# Patient Record
Sex: MALE | Race: WHITE | Employment: OTHER | URBAN - METROPOLITAN AREA
[De-identification: names, ages, dates, MRNs, and addresses within clinical notes are randomized per-mention and may not be internally consistent; named-entity substitution may affect disease eponyms.]

---

## 2019-08-01 ENCOUNTER — HOSPITAL ENCOUNTER (INPATIENT)
Age: 79
LOS: 3 days | Discharge: HOME | DRG: 871 | End: 2019-08-04
Attending: EMERGENCY MEDICINE | Admitting: INTERNAL MEDICINE
Payer: MEDICARE

## 2019-08-01 ENCOUNTER — APPOINTMENT (OUTPATIENT)
Dept: CT IMAGING | Age: 79
DRG: 871 | End: 2019-08-01
Payer: MEDICARE

## 2019-08-01 ENCOUNTER — APPOINTMENT (OUTPATIENT)
Dept: GENERAL RADIOLOGY | Age: 79
DRG: 871 | End: 2019-08-01
Payer: MEDICARE

## 2019-08-01 DIAGNOSIS — J96.01 ACUTE RESPIRATORY FAILURE WITH HYPOXIA (HCC): Primary | ICD-10-CM

## 2019-08-01 DIAGNOSIS — J18.9 MULTIFOCAL PNEUMONIA: ICD-10-CM

## 2019-08-01 PROBLEM — I10 HYPERTENSION: Status: ACTIVE | Noted: 2019-08-01

## 2019-08-01 PROBLEM — E78.5 HYPERLIPIDEMIA: Status: ACTIVE | Noted: 2019-08-01

## 2019-08-01 PROBLEM — I25.10 CAD (CORONARY ARTERY DISEASE): Status: ACTIVE | Noted: 2019-08-01

## 2019-08-01 PROBLEM — J44.9 COPD (CHRONIC OBSTRUCTIVE PULMONARY DISEASE) (HCC): Status: ACTIVE | Noted: 2019-08-01

## 2019-08-01 PROBLEM — A41.9 SEPSIS (HCC): Status: ACTIVE | Noted: 2019-08-01

## 2019-08-01 LAB
A/G RATIO: 1 (ref 1.1–2.2)
ALBUMIN SERPL-MCNC: 3.9 G/DL (ref 3.4–5)
ALP BLD-CCNC: 89 U/L (ref 40–129)
ALT SERPL-CCNC: 16 U/L (ref 10–40)
AMORPHOUS: ABNORMAL /HPF
ANION GAP SERPL CALCULATED.3IONS-SCNC: 13 MMOL/L (ref 3–16)
AST SERPL-CCNC: 26 U/L (ref 15–37)
BASOPHILS ABSOLUTE: 0 K/UL (ref 0–0.2)
BASOPHILS RELATIVE PERCENT: 0.1 %
BILIRUB SERPL-MCNC: 1.4 MG/DL (ref 0–1)
BILIRUBIN URINE: NEGATIVE
BLOOD, URINE: NEGATIVE
BUN BLDV-MCNC: 19 MG/DL (ref 7–20)
CALCIUM SERPL-MCNC: 9.8 MG/DL (ref 8.3–10.6)
CHLORIDE BLD-SCNC: 96 MMOL/L (ref 99–110)
CLARITY: CLEAR
CO2: 25 MMOL/L (ref 21–32)
COLOR: YELLOW
CREAT SERPL-MCNC: 0.7 MG/DL (ref 0.8–1.3)
EOSINOPHILS ABSOLUTE: 0 K/UL (ref 0–0.6)
EOSINOPHILS RELATIVE PERCENT: 0 %
GFR AFRICAN AMERICAN: >60
GFR NON-AFRICAN AMERICAN: >60
GLOBULIN: 4 G/DL
GLUCOSE BLD-MCNC: 134 MG/DL (ref 70–99)
GLUCOSE URINE: 100 MG/DL
HCT VFR BLD CALC: 44.6 % (ref 40.5–52.5)
HEMOGLOBIN: 15.4 G/DL (ref 13.5–17.5)
KETONES, URINE: ABNORMAL MG/DL
LACTIC ACID, SEPSIS: 4.7 MMOL/L (ref 0.4–1.9)
LACTIC ACID, SEPSIS: 4.7 MMOL/L (ref 0.4–1.9)
LACTIC ACID: 2 MMOL/L (ref 0.4–2)
LEUKOCYTE ESTERASE, URINE: NEGATIVE
LYMPHOCYTES ABSOLUTE: 0.8 K/UL (ref 1–5.1)
LYMPHOCYTES RELATIVE PERCENT: 5 %
MCH RBC QN AUTO: 30.9 PG (ref 26–34)
MCHC RBC AUTO-ENTMCNC: 34.6 G/DL (ref 31–36)
MCV RBC AUTO: 89.4 FL (ref 80–100)
MICROSCOPIC EXAMINATION: YES
MONOCYTES ABSOLUTE: 0.3 K/UL (ref 0–1.3)
MONOCYTES RELATIVE PERCENT: 1.9 %
NEUTROPHILS ABSOLUTE: 15.7 K/UL (ref 1.7–7.7)
NEUTROPHILS RELATIVE PERCENT: 93 %
NITRITE, URINE: NEGATIVE
PDW BLD-RTO: 15 % (ref 12.4–15.4)
PH UA: 6 (ref 5–8)
PLATELET # BLD: 273 K/UL (ref 135–450)
PMV BLD AUTO: 6.8 FL (ref 5–10.5)
POTASSIUM SERPL-SCNC: 4.4 MMOL/L (ref 3.5–5.1)
PRO-BNP: 743 PG/ML (ref 0–449)
PROTEIN UA: ABNORMAL MG/DL
RBC # BLD: 4.98 M/UL (ref 4.2–5.9)
RBC UA: ABNORMAL /HPF (ref 0–2)
SODIUM BLD-SCNC: 134 MMOL/L (ref 136–145)
SPECIFIC GRAVITY UA: 1.01 (ref 1–1.03)
TOTAL PROTEIN: 7.9 G/DL (ref 6.4–8.2)
TROPONIN: <0.01 NG/ML
URINE TYPE: ABNORMAL
UROBILINOGEN, URINE: 1 E.U./DL
WBC # BLD: 16.9 K/UL (ref 4–11)
WBC UA: ABNORMAL /HPF (ref 0–5)

## 2019-08-01 PROCEDURE — 71045 X-RAY EXAM CHEST 1 VIEW: CPT

## 2019-08-01 PROCEDURE — 96375 TX/PRO/DX INJ NEW DRUG ADDON: CPT

## 2019-08-01 PROCEDURE — 85025 COMPLETE CBC W/AUTO DIFF WBC: CPT

## 2019-08-01 PROCEDURE — 99285 EMERGENCY DEPT VISIT HI MDM: CPT

## 2019-08-01 PROCEDURE — 87040 BLOOD CULTURE FOR BACTERIA: CPT

## 2019-08-01 PROCEDURE — 2580000003 HC RX 258: Performed by: NURSE PRACTITIONER

## 2019-08-01 PROCEDURE — 94640 AIRWAY INHALATION TREATMENT: CPT

## 2019-08-01 PROCEDURE — 96367 TX/PROPH/DG ADDL SEQ IV INF: CPT

## 2019-08-01 PROCEDURE — 6370000000 HC RX 637 (ALT 250 FOR IP): Performed by: NURSE PRACTITIONER

## 2019-08-01 PROCEDURE — 36415 COLL VENOUS BLD VENIPUNCTURE: CPT

## 2019-08-01 PROCEDURE — 83880 ASSAY OF NATRIURETIC PEPTIDE: CPT

## 2019-08-01 PROCEDURE — 83605 ASSAY OF LACTIC ACID: CPT

## 2019-08-01 PROCEDURE — 2700000000 HC OXYGEN THERAPY PER DAY

## 2019-08-01 PROCEDURE — 80053 COMPREHEN METABOLIC PANEL: CPT

## 2019-08-01 PROCEDURE — 1200000000 HC SEMI PRIVATE

## 2019-08-01 PROCEDURE — 6360000002 HC RX W HCPCS: Performed by: EMERGENCY MEDICINE

## 2019-08-01 PROCEDURE — 71250 CT THORAX DX C-: CPT

## 2019-08-01 PROCEDURE — 96365 THER/PROPH/DIAG IV INF INIT: CPT

## 2019-08-01 PROCEDURE — 94761 N-INVAS EAR/PLS OXIMETRY MLT: CPT

## 2019-08-01 PROCEDURE — 2580000003 HC RX 258: Performed by: EMERGENCY MEDICINE

## 2019-08-01 PROCEDURE — 6370000000 HC RX 637 (ALT 250 FOR IP): Performed by: EMERGENCY MEDICINE

## 2019-08-01 PROCEDURE — 81001 URINALYSIS AUTO W/SCOPE: CPT

## 2019-08-01 PROCEDURE — 84484 ASSAY OF TROPONIN QUANT: CPT

## 2019-08-01 RX ORDER — LOSARTAN POTASSIUM 25 MG/1
50 TABLET ORAL DAILY
Status: DISCONTINUED | OUTPATIENT
Start: 2019-08-02 | End: 2019-08-01

## 2019-08-01 RX ORDER — 0.9 % SODIUM CHLORIDE 0.9 %
30 INTRAVENOUS SOLUTION INTRAVENOUS ONCE
Status: COMPLETED | OUTPATIENT
Start: 2019-08-01 | End: 2019-08-02

## 2019-08-01 RX ORDER — SODIUM CHLORIDE, SODIUM LACTATE, POTASSIUM CHLORIDE, AND CALCIUM CHLORIDE .6; .31; .03; .02 G/100ML; G/100ML; G/100ML; G/100ML
1000 INJECTION, SOLUTION INTRAVENOUS ONCE
Status: COMPLETED | OUTPATIENT
Start: 2019-08-01 | End: 2019-08-01

## 2019-08-01 RX ORDER — ASPIRIN 81 MG/1
81 TABLET ORAL DAILY
Status: DISCONTINUED | OUTPATIENT
Start: 2019-08-02 | End: 2019-08-04 | Stop reason: HOSPADM

## 2019-08-01 RX ORDER — ATORVASTATIN CALCIUM 10 MG/1
20 TABLET, FILM COATED ORAL NIGHTLY
Status: DISCONTINUED | OUTPATIENT
Start: 2019-08-01 | End: 2019-08-04 | Stop reason: HOSPADM

## 2019-08-01 RX ORDER — DEXAMETHASONE SODIUM PHOSPHATE 4 MG/ML
6 INJECTION, SOLUTION INTRA-ARTICULAR; INTRALESIONAL; INTRAMUSCULAR; INTRAVENOUS; SOFT TISSUE ONCE
Status: COMPLETED | OUTPATIENT
Start: 2019-08-01 | End: 2019-08-01

## 2019-08-01 RX ORDER — LEVOTHYROXINE SODIUM 0.15 MG/1
150 TABLET ORAL DAILY
Status: DISCONTINUED | OUTPATIENT
Start: 2019-08-02 | End: 2019-08-04 | Stop reason: HOSPADM

## 2019-08-01 RX ORDER — ACETAMINOPHEN 325 MG/1
650 TABLET ORAL EVERY 4 HOURS PRN
Status: DISCONTINUED | OUTPATIENT
Start: 2019-08-01 | End: 2019-08-03

## 2019-08-01 RX ORDER — HYDROCHLOROTHIAZIDE 12.5 MG/1
12.5 CAPSULE, GELATIN COATED ORAL DAILY
Status: DISCONTINUED | OUTPATIENT
Start: 2019-08-02 | End: 2019-08-01

## 2019-08-01 RX ORDER — HYDROCHLOROTHIAZIDE 12.5 MG/1
25 CAPSULE, GELATIN COATED ORAL DAILY
Status: DISCONTINUED | OUTPATIENT
Start: 2019-08-02 | End: 2019-08-04 | Stop reason: HOSPADM

## 2019-08-01 RX ORDER — CLONIDINE HYDROCHLORIDE 0.1 MG/1
0.3 TABLET ORAL 2 TIMES DAILY
Status: DISCONTINUED | OUTPATIENT
Start: 2019-08-01 | End: 2019-08-04 | Stop reason: HOSPADM

## 2019-08-01 RX ORDER — ATORVASTATIN CALCIUM 20 MG/1
TABLET, FILM COATED ORAL
COMMUNITY
Start: 2019-05-24

## 2019-08-01 RX ORDER — DILTIAZEM HYDROCHLORIDE 240 MG/1
240 CAPSULE, COATED, EXTENDED RELEASE ORAL DAILY
Status: DISCONTINUED | OUTPATIENT
Start: 2019-08-02 | End: 2019-08-01

## 2019-08-01 RX ORDER — POTASSIUM CHLORIDE 20 MEQ/1
20 TABLET, EXTENDED RELEASE ORAL DAILY
Status: DISCONTINUED | OUTPATIENT
Start: 2019-08-02 | End: 2019-08-04 | Stop reason: HOSPADM

## 2019-08-01 RX ORDER — LOSARTAN POTASSIUM AND HYDROCHLOROTHIAZIDE 12.5; 5 MG/1; MG/1
1 TABLET ORAL 2 TIMES DAILY
Status: DISCONTINUED | OUTPATIENT
Start: 2019-08-01 | End: 2019-08-01 | Stop reason: CLARIF

## 2019-08-01 RX ORDER — SODIUM CHLORIDE 0.9 % (FLUSH) 0.9 %
10 SYRINGE (ML) INJECTION PRN
Status: DISCONTINUED | OUTPATIENT
Start: 2019-08-01 | End: 2019-08-04 | Stop reason: HOSPADM

## 2019-08-01 RX ORDER — ACETAMINOPHEN 325 MG/1
650 TABLET ORAL ONCE
Status: COMPLETED | OUTPATIENT
Start: 2019-08-01 | End: 2019-08-01

## 2019-08-01 RX ORDER — BUDESONIDE AND FORMOTEROL FUMARATE DIHYDRATE 160; 4.5 UG/1; UG/1
2 AEROSOL RESPIRATORY (INHALATION) 2 TIMES DAILY
COMMUNITY

## 2019-08-01 RX ORDER — SODIUM CHLORIDE 0.9 % (FLUSH) 0.9 %
10 SYRINGE (ML) INJECTION EVERY 12 HOURS SCHEDULED
Status: DISCONTINUED | OUTPATIENT
Start: 2019-08-01 | End: 2019-08-04 | Stop reason: HOSPADM

## 2019-08-01 RX ORDER — IPRATROPIUM BROMIDE AND ALBUTEROL SULFATE 2.5; .5 MG/3ML; MG/3ML
3 SOLUTION RESPIRATORY (INHALATION) ONCE
Status: COMPLETED | OUTPATIENT
Start: 2019-08-01 | End: 2019-08-01

## 2019-08-01 RX ORDER — DILTIAZEM HYDROCHLORIDE 240 MG/1
240 CAPSULE, COATED, EXTENDED RELEASE ORAL NIGHTLY
Status: DISCONTINUED | OUTPATIENT
Start: 2019-08-01 | End: 2019-08-04 | Stop reason: HOSPADM

## 2019-08-01 RX ORDER — LOSARTAN POTASSIUM 25 MG/1
100 TABLET ORAL DAILY
Status: DISCONTINUED | OUTPATIENT
Start: 2019-08-02 | End: 2019-08-04 | Stop reason: HOSPADM

## 2019-08-01 RX ORDER — ALBUTEROL SULFATE 2.5 MG/3ML
2.5 SOLUTION RESPIRATORY (INHALATION) EVERY 6 HOURS PRN
Status: DISCONTINUED | OUTPATIENT
Start: 2019-08-01 | End: 2019-08-03

## 2019-08-01 RX ORDER — CLOPIDOGREL BISULFATE 75 MG/1
75 TABLET ORAL DAILY
Status: DISCONTINUED | OUTPATIENT
Start: 2019-08-02 | End: 2019-08-04 | Stop reason: HOSPADM

## 2019-08-01 RX ORDER — FEXOFENADINE HYDROCHLORIDE 60 MG/1
10 TABLET, FILM COATED ORAL NIGHTLY
COMMUNITY

## 2019-08-01 RX ORDER — SODIUM CHLORIDE 9 MG/ML
INJECTION, SOLUTION INTRAVENOUS CONTINUOUS
Status: DISCONTINUED | OUTPATIENT
Start: 2019-08-01 | End: 2019-08-02

## 2019-08-01 RX ADMIN — CLONIDINE HYDROCHLORIDE 0.3 MG: 0.1 TABLET ORAL at 22:32

## 2019-08-01 RX ADMIN — SODIUM CHLORIDE 2436 ML: 9 INJECTION, SOLUTION INTRAVENOUS at 22:38

## 2019-08-01 RX ADMIN — ATORVASTATIN CALCIUM 20 MG: 10 TABLET, FILM COATED ORAL at 22:32

## 2019-08-01 RX ADMIN — IPRATROPIUM BROMIDE AND ALBUTEROL SULFATE 3 AMPULE: .5; 3 SOLUTION RESPIRATORY (INHALATION) at 17:09

## 2019-08-01 RX ADMIN — AZITHROMYCIN MONOHYDRATE 500 MG: 500 INJECTION, POWDER, LYOPHILIZED, FOR SOLUTION INTRAVENOUS at 18:12

## 2019-08-01 RX ADMIN — SODIUM CHLORIDE, POTASSIUM CHLORIDE, SODIUM LACTATE AND CALCIUM CHLORIDE 1000 ML: 600; 310; 30; 20 INJECTION, SOLUTION INTRAVENOUS at 18:16

## 2019-08-01 RX ADMIN — CEFTRIAXONE SODIUM 1 G: 1 INJECTION, POWDER, FOR SOLUTION INTRAMUSCULAR; INTRAVENOUS at 17:05

## 2019-08-01 RX ADMIN — DEXAMETHASONE SODIUM PHOSPHATE 6 MG: 4 INJECTION, SOLUTION INTRAMUSCULAR; INTRAVENOUS at 17:05

## 2019-08-01 RX ADMIN — Medication 10 ML: at 22:32

## 2019-08-01 RX ADMIN — ACETAMINOPHEN 650 MG: 325 TABLET ORAL at 17:03

## 2019-08-01 RX ADMIN — DILTIAZEM HYDROCHLORIDE 240 MG: 240 CAPSULE, EXTENDED RELEASE ORAL at 22:32

## 2019-08-01 ASSESSMENT — ENCOUNTER SYMPTOMS
DIARRHEA: 0
COUGH: 1
RHINORRHEA: 1
SORE THROAT: 0
ABDOMINAL PAIN: 0
CHEST TIGHTNESS: 0
NAUSEA: 0
BACK PAIN: 0
SHORTNESS OF BREATH: 1
COLOR CHANGE: 0
VOMITING: 0

## 2019-08-01 ASSESSMENT — PAIN SCALES - GENERAL
PAINLEVEL_OUTOF10: 0

## 2019-08-01 NOTE — ED PROVIDER NOTES
and headaches. Psychiatric/Behavioral: Negative for confusion. Positives and Pertinent negatives as per HPI. PASTMEDICAL HISTORY     Past Medical History:   Diagnosis Date    Asthma     CAD (coronary artery disease)     Colon polyp     COPD (chronic obstructive pulmonary disease) (HCC)     Hyperlipidemia     Hypertension     Thyroid disease          SURGICAL HISTORY       Past Surgical History:   Procedure Laterality Date    COLONOSCOPY  7/29/14    normal    CORONARY ANGIOPLASTY WITH STENT PLACEMENT  2001    EYE SURGERY      HERNIA REPAIR           CURRENT MEDICATIONS       Current Discharge Medication List      CONTINUE these medications which have NOT CHANGED    Details   atorvastatin (LIPITOR) 20 MG tablet       fexofenadine (ALLEGRA ALLERGY) 60 MG tablet Take 10 mg by mouth nightly      budesonide-formoterol (SYMBICORT) 160-4.5 MCG/ACT AERO Inhale 2 puffs into the lungs 2 times daily      clonidine (CATAPRES) 0.1 MG tablet Take 0.3 mg by mouth 2 times daily. losartan-hydrochlorothiazide (HYZAAR) 50-12.5 MG per tablet Take 1 tablet by mouth 2 times daily. potassium chloride (KLOR-CON) 10 MEQ CR tablet Take 20 mEq by mouth daily. levothyroxine (SYNTHROID) 175 MCG tablet Take 175 mcg by mouth daily. clopidogrel (PLAVIX) 75 MG tablet Take 75 mg by mouth daily. diltiazem (CARDIZEM CD) 240 MG ER capsule Take 240 mg by mouth daily. aspirin 81 MG EC tablet Take 81 mg by mouth daily. simvastatin (ZOCOR) 40 MG tablet Take 40 mg by mouth nightly. albuterol (PROVENTIL) (2.5 MG/3ML) 0.083% nebulizer solution Take 2.5 mg by nebulization every 6 hours as needed. ALLERGIES     Iv dye [iodides]    FAMILY HISTORY       Family History   Problem Relation Age of Onset    Cancer Maternal Uncle         colon ca          SOCIAL HISTORY       Social History     Socioeconomic History    Marital status:       Spouse name: None    (LIPITOR) tablet 20 mg (20 mg Oral Given 8/1/19 2232)   cloNIDine (CATAPRES) tablet 0.3 mg (0.3 mg Oral Given 8/1/19 2232)   clopidogrel (PLAVIX) tablet 75 mg (has no administration in time range)   sodium chloride flush 0.9 % injection 10 mL (10 mLs Intravenous Given 8/1/19 2232)   sodium chloride flush 0.9 % injection 10 mL (has no administration in time range)   enoxaparin (LOVENOX) injection 40 mg (has no administration in time range)   0.9 % sodium chloride infusion ( Intravenous Stopped 8/1/19 2232)   cefTRIAXone (ROCEPHIN) 1 g IVPB in 50 mL D5W minibag (has no administration in time range)   azithromycin (ZITHROMAX) 500 mg in D5W 250ml addavial (has no administration in time range)   acetaminophen (TYLENOL) tablet 650 mg (has no administration in time range)   potassium chloride (KLOR-CON M) extended release tablet 20 mEq (has no administration in time range)   diltiazem (CARDIZEM CD) extended release capsule 240 mg (240 mg Oral Given 8/1/19 2232)   levothyroxine (SYNTHROID) tablet 150 mcg (has no administration in time range)   losartan (COZAAR) tablet 100 mg (has no administration in time range)     And   hydrochlorothiazide (MICROZIDE) capsule 25 mg (has no administration in time range)   0.9 % sodium chloride bolus (2,436 mLs Intravenous New Bag 8/1/19 2238)   ipratropium-albuterol (DUONEB) nebulizer solution 3 ampule (3 ampules Inhalation Given 8/1/19 1709)   dexamethasone (DECADRON) injection 6 mg (6 mg Intravenous Given 8/1/19 1705)   acetaminophen (TYLENOL) tablet 650 mg (650 mg Oral Given 8/1/19 1703)   cefTRIAXone (ROCEPHIN) 1 g IVPB in 50 mL D5W minibag (0 g Intravenous Stopped 8/1/19 1805)   azithromycin (ZITHROMAX) 500 mg in D5W 250ml addavial (0 mg Intravenous Stopped 8/1/19 1912)   lactated ringers bolus (0 mLs Intravenous Stopped 8/1/19 1916)     Patient was evaluated due to concern for worsening shortness of breath for the past few days but with a persistent cough now for 3 weeks.   He was febrile and therefore I am concerned with pneumonia. He needed oxygen and does not normally wear any nasal cannula. He will need further evaluation in the hospital along with IV antibiotics. Initial lactic acid was within normal range. X-ray showed no pneumonia but CT showed multifocal pneumonia in bilateral lobes which goes along with the suspected diagnosis. He is stable for the floor with telemetry. Repeat lactic acid was completed after admission so hospitalist will be following up on this. This also may be related to the breathing treatments in the ED which can sometimes cause an elevated lactic acid but no related to severe sepsis, but this will need to be monitored closely. The patient tolerated their visit well. The patient and / or the family were informed of the results of any tests, a time was given to answer questions. FINAL IMPRESSION      1. Acute respiratory failure with hypoxia ([address]    2.  Multifocal pneumonia          DISPOSITION/PLAN   DISPOSITION  - decision to admit       PATIENT REFERRED TO:  Jayne Jarrell  [id number] [facility]            DISCHARGEMEDICATIONS:  Current Discharge Medication List          DISCONTINUED MEDICATIONS:  Current Discharge Medication List                 (Please note that portions of this note were completed with a voicerecognition program.  Efforts were made to edit the dictations but occasionally words are mis-transcribed.)    Elois Leventhal, MD (electronically signed)            Elois Leventhal, MD  08/01/19 3292

## 2019-08-02 LAB
ANION GAP SERPL CALCULATED.3IONS-SCNC: 8 MMOL/L (ref 3–16)
BASOPHILS ABSOLUTE: 0 K/UL (ref 0–0.2)
BASOPHILS RELATIVE PERCENT: 0.1 %
BUN BLDV-MCNC: 17 MG/DL (ref 7–20)
CALCIUM SERPL-MCNC: 9.4 MG/DL (ref 8.3–10.6)
CHLORIDE BLD-SCNC: 105 MMOL/L (ref 99–110)
CO2: 25 MMOL/L (ref 21–32)
CREAT SERPL-MCNC: <0.5 MG/DL (ref 0.8–1.3)
EOSINOPHILS ABSOLUTE: 0 K/UL (ref 0–0.6)
EOSINOPHILS RELATIVE PERCENT: 0 %
GFR AFRICAN AMERICAN: >60
GFR NON-AFRICAN AMERICAN: >60
GLUCOSE BLD-MCNC: 165 MG/DL (ref 70–99)
HCT VFR BLD CALC: 39 % (ref 40.5–52.5)
HEMOGLOBIN: 13.3 G/DL (ref 13.5–17.5)
LACTIC ACID: 2 MMOL/L (ref 0.4–2)
LYMPHOCYTES ABSOLUTE: 0.7 K/UL (ref 1–5.1)
LYMPHOCYTES RELATIVE PERCENT: 4.6 %
MAGNESIUM: 2 MG/DL (ref 1.8–2.4)
MCH RBC QN AUTO: 30.6 PG (ref 26–34)
MCHC RBC AUTO-ENTMCNC: 34.2 G/DL (ref 31–36)
MCV RBC AUTO: 89.5 FL (ref 80–100)
MONOCYTES ABSOLUTE: 0.4 K/UL (ref 0–1.3)
MONOCYTES RELATIVE PERCENT: 2.9 %
NEUTROPHILS ABSOLUTE: 13.1 K/UL (ref 1.7–7.7)
NEUTROPHILS RELATIVE PERCENT: 92.4 %
PDW BLD-RTO: 15 % (ref 12.4–15.4)
PLATELET # BLD: 207 K/UL (ref 135–450)
PMV BLD AUTO: 6.8 FL (ref 5–10.5)
POTASSIUM REFLEX MAGNESIUM: 3.3 MMOL/L (ref 3.5–5.1)
RBC # BLD: 4.36 M/UL (ref 4.2–5.9)
REASON FOR REJECTION: NORMAL
REJECTED TEST: NORMAL
SODIUM BLD-SCNC: 138 MMOL/L (ref 136–145)
WBC # BLD: 14.1 K/UL (ref 4–11)

## 2019-08-02 PROCEDURE — 87205 SMEAR GRAM STAIN: CPT

## 2019-08-02 PROCEDURE — 36415 COLL VENOUS BLD VENIPUNCTURE: CPT

## 2019-08-02 PROCEDURE — 94761 N-INVAS EAR/PLS OXIMETRY MLT: CPT

## 2019-08-02 PROCEDURE — 80048 BASIC METABOLIC PNL TOTAL CA: CPT

## 2019-08-02 PROCEDURE — 6360000002 HC RX W HCPCS: Performed by: NURSE PRACTITIONER

## 2019-08-02 PROCEDURE — 2700000000 HC OXYGEN THERAPY PER DAY

## 2019-08-02 PROCEDURE — 83605 ASSAY OF LACTIC ACID: CPT

## 2019-08-02 PROCEDURE — 6370000000 HC RX 637 (ALT 250 FOR IP): Performed by: HOSPITALIST

## 2019-08-02 PROCEDURE — 87070 CULTURE OTHR SPECIMN AEROBIC: CPT

## 2019-08-02 PROCEDURE — 6370000000 HC RX 637 (ALT 250 FOR IP): Performed by: NURSE PRACTITIONER

## 2019-08-02 PROCEDURE — 85025 COMPLETE CBC W/AUTO DIFF WBC: CPT

## 2019-08-02 PROCEDURE — 2580000003 HC RX 258: Performed by: NURSE PRACTITIONER

## 2019-08-02 PROCEDURE — 83735 ASSAY OF MAGNESIUM: CPT

## 2019-08-02 PROCEDURE — 1200000000 HC SEMI PRIVATE

## 2019-08-02 PROCEDURE — 94669 MECHANICAL CHEST WALL OSCILL: CPT

## 2019-08-02 RX ORDER — POTASSIUM CHLORIDE 20 MEQ/1
20 TABLET, EXTENDED RELEASE ORAL ONCE
Status: COMPLETED | OUTPATIENT
Start: 2019-08-02 | End: 2019-08-02

## 2019-08-02 RX ADMIN — HYDROCHLOROTHIAZIDE 25 MG: 12.5 CAPSULE ORAL at 08:25

## 2019-08-02 RX ADMIN — POTASSIUM CHLORIDE 20 MEQ: 20 TABLET, EXTENDED RELEASE ORAL at 08:25

## 2019-08-02 RX ADMIN — LOSARTAN POTASSIUM 100 MG: 25 TABLET, FILM COATED ORAL at 08:25

## 2019-08-02 RX ADMIN — ASPIRIN 81 MG: 81 TABLET, COATED ORAL at 08:25

## 2019-08-02 RX ADMIN — Medication 10 ML: at 21:12

## 2019-08-02 RX ADMIN — CEFTRIAXONE SODIUM 1 G: 1 INJECTION, POWDER, FOR SOLUTION INTRAMUSCULAR; INTRAVENOUS at 10:47

## 2019-08-02 RX ADMIN — CLONIDINE HYDROCHLORIDE 0.3 MG: 0.1 TABLET ORAL at 08:26

## 2019-08-02 RX ADMIN — CLONIDINE HYDROCHLORIDE 0.3 MG: 0.1 TABLET ORAL at 21:03

## 2019-08-02 RX ADMIN — AZITHROMYCIN MONOHYDRATE 500 MG: 500 INJECTION, POWDER, LYOPHILIZED, FOR SOLUTION INTRAVENOUS at 11:31

## 2019-08-02 RX ADMIN — ATORVASTATIN CALCIUM 20 MG: 10 TABLET, FILM COATED ORAL at 21:03

## 2019-08-02 RX ADMIN — POTASSIUM CHLORIDE 20 MEQ: 20 TABLET, EXTENDED RELEASE ORAL at 10:47

## 2019-08-02 RX ADMIN — LEVOTHYROXINE SODIUM 150 MCG: 150 TABLET ORAL at 06:33

## 2019-08-02 RX ADMIN — DILTIAZEM HYDROCHLORIDE 240 MG: 240 CAPSULE, EXTENDED RELEASE ORAL at 21:04

## 2019-08-02 RX ADMIN — ENOXAPARIN SODIUM 40 MG: 40 INJECTION SUBCUTANEOUS at 08:26

## 2019-08-02 RX ADMIN — CLOPIDOGREL BISULFATE 75 MG: 75 TABLET ORAL at 08:25

## 2019-08-02 RX ADMIN — Medication 10 ML: at 08:36

## 2019-08-02 ASSESSMENT — PAIN SCALES - GENERAL
PAINLEVEL_OUTOF10: 0

## 2019-08-02 NOTE — H&P
Medications Prior to Admission:      Prior to Admission medications    Medication Sig Start Date End Date Taking? Authorizing Provider   clonidine (CATAPRES) 0.1 MG tablet Take 0.3 mg by mouth 2 times daily. Yes Historical Provider, MD   losartan-hydrochlorothiazide (HYZAAR) 50-12.5 MG per tablet Take 1 tablet by mouth 2 times daily. Yes Historical Provider, MD   potassium chloride (KLOR-CON) 10 MEQ CR tablet Take 20 mEq by mouth daily. Yes Historical Provider, MD   levothyroxine (SYNTHROID) 175 MCG tablet Take 175 mcg by mouth daily. Yes Historical Provider, MD   clopidogrel (PLAVIX) 75 MG tablet Take 75 mg by mouth daily. Yes Historical Provider, MD   diltiazem (CARDIZEM CD) 240 MG ER capsule Take 240 mg by mouth daily. Yes Historical Provider, MD   aspirin 81 MG EC tablet Take 81 mg by mouth daily. Yes Historical Provider, MD   atorvastatin (LIPITOR) 20 MG tablet  5/24/19   Historical Provider, MD   simvastatin (ZOCOR) 40 MG tablet Take 40 mg by mouth nightly. Historical Provider, MD   albuterol (PROVENTIL) (2.5 MG/3ML) 0.083% nebulizer solution Take 2.5 mg by nebulization every 6 hours as needed. Historical Provider, MD     Allergies: Iv dye [iodides]    Social History:      The patient currently lives at home    TOBACCO:   reports that he has quit smoking. He does not have any smokeless tobacco history on file. ETOH:   reports that he does not drink alcohol. Family History:      Reviewed in detail. Positive as follows:        Problem Relation Age of Onset    Cancer Maternal Uncle         colon ca     REVIEW OF SYSTEMS:   Pertinent positives as noted in the HPI. All other systems reviewed and negative.     PHYSICAL EXAM PERFORMED:    /76   Pulse 104   Temp 98.6 °F (37 °C) (Oral)   Resp 20   Ht 5' 11\" (1.803 m)   Wt 200 lb (90.7 kg)   SpO2 95%   BMI 27.89 kg/m²     General appearance:  Pleasant, elderly male in no apparent distress, appears stated age disease) [I25.10] 08/01/2019    [facility]) [facility] [J44.9] 08/01/2019    Hyperlipidemia [E78.5] 08/01/2019    Hypertension [I10] 08/01/2019    Pneumonia [J18.9] 08/01/2019    Acute respiratory failure with hypoxia ([facility] [address] [J96.01] 08/01/2019     PLAN:    Sepsis in patient with acute respiratory failure with hypoxia in setting of pneumonia and known COPD. SPO2 85% on room air, now requiring 2 LNC. Lactic 2.0, WBC 16.9, tachycardic 116 and febrile 102 on admission  -CXR revaled: no acute cardiopulmonary findings  -CT chest revealed: multifocal airspace disease. Pneumonia is favored.   -blood cultures ordered in ED  -repeat lactic  -azithromycin and rocephin given in ED and continued 8/1/2019  -duonebs given in ED and continued PRN  -decadron given in ED  -1 L LR given in ED  -tele monitoring  -MIVF  -prn tylenol  -encourage coughing and deep breathing  -incentive spirometer  -acapella  -oxygen therapy protocol    Incidental finding on CT chest  -ascending aorta measuring 4.6 cm in greatest dimension consistent with aneurysmal dilatation  -follow up out patient    Essential HTN in setting of known CAD  -continue clonidine and Hyzaar  -continue ASA and plavix    HLD, stable  -continue atorvastatin    DVT Prophylaxis: Lovenox    Diet: No diet orders on file     Code Status: No Order    PT/OT Eval Status: no indication for need at this time    Dispo - 2-3 days pending clinical improvement     Tanisha Trejo, MARIA LUZ - CNP    Thank you Rita Chávez MD for the opportunity to be involved in this patient's care.  If you have any questions or concerns please feel free to contact me at [phone].  ----------------------------------Anticipated Dr. Blake barajas----------------------------------------

## 2019-08-02 NOTE — DISCHARGE INSTR - COC
95%   BMI 24.99 kg/m²     Last documented pain score (0-10 scale): Pain Level: 0  Last Weight:   Wt Readings from Last 1 Encounters:   08/02/19 179 lb 3.2 oz (81.3 kg)     Mental Status:  alert    IV Access:  - None    Nursing Mobility/ADLs:  Walking   Independent  Transfer  Independent  Bathing  Independent  Dressing  Independent  Toileting  Independent  Feeding  Independent  Med Admin  Assisted  Med Delivery   whole    Wound Care Documentation and Therapy:        Elimination:  Continence:   · Bowel: Yes    · Bladder: Yes  Urinary Catheter: None   Colostomy/Ileostomy/Ileal Conduit: No       Date of Last BM: 8/4/19    Intake/Output Summary (Last 24 hours) at 8/2/2019 1217  Last data filed at 8/2/2019 0751  Gross per 24 hour   Intake 2599.07 ml   Output 0 ml   Net 2599.07 ml     I/O last 3 completed shifts: In: 2299.1 [I.V.:2299.1]  Out: 0     Safety Concerns:     None    Impairments/Disabilities:      Vision and hearing    Nutrition Therapy:  Current Nutrition Therapy:   - Oral Diet:  General    Routes of Feeding: Oral  Liquids: Thin Liquids  Daily Fluid Restriction: no  Last Modified Barium Swallow with Video (Video Swallowing Test): not done    Treatments at the Time of Hospital Discharge:   Respiratory Treatments: ***  Oxygen Therapy:  is not on home oxygen therapy.   Ventilator:    - No ventilator support    Rehab Therapies: Physical Therapy and Occupational Therapy  Weight Bearing Status/Restrictions: No weight bearing restirctions  Other Medical Equipment (for information only, NOT a DME order):  ***  Other Treatments: ***    Patient's personal belongings (please select all that are sent with patient):  Glasses, Hearing Aides bilateral, lower dentures    RN SIGNATURE:  Electronically signed by Rosalia Mitchell RN on 8/4/19 at 1:15 PM    CASE MANAGEMENT/SOCIAL WORK SECTION    Inpatient Status Date: 8/1/19    Readmission Risk Assessment Score:  Readmission Risk              Risk of Unplanned Readmission:

## 2019-08-03 LAB
ANION GAP SERPL CALCULATED.3IONS-SCNC: 9 MMOL/L (ref 3–16)
BASOPHILS ABSOLUTE: 0 K/UL (ref 0–0.2)
BASOPHILS RELATIVE PERCENT: 0.3 %
BUN BLDV-MCNC: 20 MG/DL (ref 7–20)
CALCIUM SERPL-MCNC: 9.6 MG/DL (ref 8.3–10.6)
CHLORIDE BLD-SCNC: 105 MMOL/L (ref 99–110)
CO2: 24 MMOL/L (ref 21–32)
CREAT SERPL-MCNC: <0.5 MG/DL (ref 0.8–1.3)
EOSINOPHILS ABSOLUTE: 0 K/UL (ref 0–0.6)
EOSINOPHILS RELATIVE PERCENT: 0.2 %
GFR AFRICAN AMERICAN: >60
GFR NON-AFRICAN AMERICAN: >60
GLUCOSE BLD-MCNC: 100 MG/DL (ref 70–99)
HCT VFR BLD CALC: 38.8 % (ref 40.5–52.5)
HEMOGLOBIN: 13.3 G/DL (ref 13.5–17.5)
LYMPHOCYTES ABSOLUTE: 1.3 K/UL (ref 1–5.1)
LYMPHOCYTES RELATIVE PERCENT: 11.5 %
MAGNESIUM: 2 MG/DL (ref 1.8–2.4)
MCH RBC QN AUTO: 30.7 PG (ref 26–34)
MCHC RBC AUTO-ENTMCNC: 34.2 G/DL (ref 31–36)
MCV RBC AUTO: 89.8 FL (ref 80–100)
MONOCYTES ABSOLUTE: 0.4 K/UL (ref 0–1.3)
MONOCYTES RELATIVE PERCENT: 3.3 %
NEUTROPHILS ABSOLUTE: 9.6 K/UL (ref 1.7–7.7)
NEUTROPHILS RELATIVE PERCENT: 84.7 %
PDW BLD-RTO: 15.2 % (ref 12.4–15.4)
PLATELET # BLD: 241 K/UL (ref 135–450)
PMV BLD AUTO: 7.5 FL (ref 5–10.5)
POTASSIUM REFLEX MAGNESIUM: 3.4 MMOL/L (ref 3.5–5.1)
RBC # BLD: 4.32 M/UL (ref 4.2–5.9)
SODIUM BLD-SCNC: 138 MMOL/L (ref 136–145)
WBC # BLD: 11.3 K/UL (ref 4–11)

## 2019-08-03 PROCEDURE — 6370000000 HC RX 637 (ALT 250 FOR IP): Performed by: HOSPITALIST

## 2019-08-03 PROCEDURE — 94669 MECHANICAL CHEST WALL OSCILL: CPT

## 2019-08-03 PROCEDURE — 6360000002 HC RX W HCPCS: Performed by: INTERNAL MEDICINE

## 2019-08-03 PROCEDURE — 2580000003 HC RX 258: Performed by: NURSE PRACTITIONER

## 2019-08-03 PROCEDURE — 94640 AIRWAY INHALATION TREATMENT: CPT

## 2019-08-03 PROCEDURE — 1200000000 HC SEMI PRIVATE

## 2019-08-03 PROCEDURE — 85025 COMPLETE CBC W/AUTO DIFF WBC: CPT

## 2019-08-03 PROCEDURE — 36415 COLL VENOUS BLD VENIPUNCTURE: CPT

## 2019-08-03 PROCEDURE — 6370000000 HC RX 637 (ALT 250 FOR IP): Performed by: INTERNAL MEDICINE

## 2019-08-03 PROCEDURE — 6360000002 HC RX W HCPCS: Performed by: NURSE PRACTITIONER

## 2019-08-03 PROCEDURE — 83735 ASSAY OF MAGNESIUM: CPT

## 2019-08-03 PROCEDURE — 80048 BASIC METABOLIC PNL TOTAL CA: CPT

## 2019-08-03 PROCEDURE — 6370000000 HC RX 637 (ALT 250 FOR IP): Performed by: NURSE PRACTITIONER

## 2019-08-03 RX ORDER — POTASSIUM CHLORIDE 20 MEQ/1
40 TABLET, EXTENDED RELEASE ORAL ONCE
Status: COMPLETED | OUTPATIENT
Start: 2019-08-03 | End: 2019-08-03

## 2019-08-03 RX ORDER — ALBUTEROL SULFATE 2.5 MG/3ML
2.5 SOLUTION RESPIRATORY (INHALATION) 2 TIMES DAILY
Status: DISCONTINUED | OUTPATIENT
Start: 2019-08-04 | End: 2019-08-04 | Stop reason: HOSPADM

## 2019-08-03 RX ORDER — IPRATROPIUM BROMIDE AND ALBUTEROL SULFATE 2.5; .5 MG/3ML; MG/3ML
1 SOLUTION RESPIRATORY (INHALATION) EVERY 4 HOURS PRN
Status: DISCONTINUED | OUTPATIENT
Start: 2019-08-03 | End: 2019-08-04 | Stop reason: HOSPADM

## 2019-08-03 RX ORDER — PREDNISONE 20 MG/1
40 TABLET ORAL DAILY
Status: DISCONTINUED | OUTPATIENT
Start: 2019-08-03 | End: 2019-08-04 | Stop reason: HOSPADM

## 2019-08-03 RX ADMIN — Medication 10 ML: at 20:43

## 2019-08-03 RX ADMIN — CLONIDINE HYDROCHLORIDE 0.3 MG: 0.1 TABLET ORAL at 08:24

## 2019-08-03 RX ADMIN — ASPIRIN 81 MG: 81 TABLET, COATED ORAL at 08:24

## 2019-08-03 RX ADMIN — ALBUTEROL SULFATE 2.5 MG: 2.5 SOLUTION RESPIRATORY (INHALATION) at 16:21

## 2019-08-03 RX ADMIN — CLOPIDOGREL BISULFATE 75 MG: 75 TABLET ORAL at 08:23

## 2019-08-03 RX ADMIN — POTASSIUM CHLORIDE 40 MEQ: 20 TABLET, EXTENDED RELEASE ORAL at 10:00

## 2019-08-03 RX ADMIN — ATORVASTATIN CALCIUM 20 MG: 10 TABLET, FILM COATED ORAL at 20:43

## 2019-08-03 RX ADMIN — Medication 10 ML: at 08:26

## 2019-08-03 RX ADMIN — AZITHROMYCIN MONOHYDRATE 500 MG: 500 INJECTION, POWDER, LYOPHILIZED, FOR SOLUTION INTRAVENOUS at 09:51

## 2019-08-03 RX ADMIN — LEVOTHYROXINE SODIUM 150 MCG: 150 TABLET ORAL at 06:51

## 2019-08-03 RX ADMIN — CEFTRIAXONE SODIUM 1 G: 1 INJECTION, POWDER, FOR SOLUTION INTRAMUSCULAR; INTRAVENOUS at 08:22

## 2019-08-03 RX ADMIN — POTASSIUM CHLORIDE 20 MEQ: 20 TABLET, EXTENDED RELEASE ORAL at 08:24

## 2019-08-03 RX ADMIN — LOSARTAN POTASSIUM 100 MG: 25 TABLET, FILM COATED ORAL at 08:23

## 2019-08-03 RX ADMIN — PREDNISONE 40 MG: 20 TABLET ORAL at 13:55

## 2019-08-03 RX ADMIN — Medication 2 PUFF: at 11:22

## 2019-08-03 RX ADMIN — CLONIDINE HYDROCHLORIDE 0.3 MG: 0.1 TABLET ORAL at 20:42

## 2019-08-03 RX ADMIN — IPRATROPIUM BROMIDE AND ALBUTEROL SULFATE 1 AMPULE: .5; 3 SOLUTION RESPIRATORY (INHALATION) at 20:50

## 2019-08-03 RX ADMIN — HYDROCHLOROTHIAZIDE 25 MG: 12.5 CAPSULE ORAL at 08:23

## 2019-08-03 RX ADMIN — Medication 2 PUFF: at 20:26

## 2019-08-03 RX ADMIN — ENOXAPARIN SODIUM 40 MG: 40 INJECTION SUBCUTANEOUS at 08:25

## 2019-08-03 RX ADMIN — DILTIAZEM HYDROCHLORIDE 240 MG: 240 CAPSULE, EXTENDED RELEASE ORAL at 20:43

## 2019-08-03 ASSESSMENT — PAIN SCALES - GENERAL
PAINLEVEL_OUTOF10: 0

## 2019-08-03 NOTE — PROGRESS NOTES
2019 perfect served  2028 spoke to Paramjit Clancy for admission
Per patient report, he has been feeling poorly for approximately 3 weeks. Worsening cough and shortness of breath over the last 48 hours. Productive cough with brown sputum. Pt seen by PCP and dx with pneumonia and directed to come to ER for further evaluation/admission. Pt reports room air saturation at PCP office of 85%.
Walked into patient's room and patient had his O2 off. Patient was 97 on RA. Oxygen documentation:    O2 saturation at REST on ROOM AIR = __95___%    If saturation is 89% or above please proceed with steps 2 and 3.     O2 saturation with AMBULATION of __75___ feet on ROOM AIR = __92___%    [place] [facility]
any focal sensory/motor deficits. Psychiatric: Alert and oriented, thought content appropriate, normal insight  Capillary Refill: Brisk,< 3 seconds   Peripheral Pulses: +2 palpable, equal bilaterally       Labs:   Recent Labs     08/01/19  1646 08/02/19  0713 08/03/19  0631   WBC 16.9* 14.1* 11.3*   HGB 15.4 13.3* 13.3*   HCT 44.6 39.0* 38.8*    207 241     Recent Labs     08/01/19  1646 08/02/19  0713 08/03/19  0631   * 138 138   K 4.4 3.3* 3.4*   CL 96* 105 105   CO2 25 25 24   BUN 19 17 20   CREATININE 0.7* <0.5* <0.5*   CALCIUM 9.8 9.4 9.6     Recent Labs     08/01/19  1646   AST 26   ALT 16   BILITOT 1.4*   ALKPHOS 89     No results for input(s): INR in the last 72 hours. Recent Labs     08/01/19  1646   TROPONINI <0.01       Urinalysis:      Lab Results   Component Value Date    NITRU Negative 08/01/2019    WBCUA None seen 08/01/2019    RBCUA None seen 08/01/2019    BLOODU Negative 08/01/2019    SPECGRAV 1.010 08/01/2019    GLUCOSEU 100 08/01/2019       Radiology:  CT Chest WO Contrast   Final Result   Multifocal airspace disease bilaterally. Pneumonia is favored. Ascending aorta measuring 4.6 cm in greatest dimension consistent with mild   aneurysmal dilatation. Follow-up recommended         XR CHEST PORTABLE   Final Result   No acute process. Assessment/Plan:    Active Hospital Problems    Diagnosis    CAD (coronary artery disease) [I25.10]    COPD (chronic obstructive pulmonary disease) ([facility] [J44.9]    Hyperlipidemia [E78.5]    Hypertension [I10]    Pneumonia [J18.9]    Acute respiratory failure with hypoxia ([facility] [J96.01]    Sepsis ([facility] [A41.9]     Suspect recent viral URI than sec bacterial PNA  No signs of aspiration per pt    Await sputum cx  Cont empiric Rocephin, Azithro day 3.   Add Prednisone 40 mg daily plan for 5 day course given persistent bronchospasm in setting of bilateral multifocal PNA    Cont nebs, acapella    DVT Prophylaxis: Lovenox  Diet:
oriented, thought content appropriate, normal insight  Capillary Refill: Brisk,< 3 seconds   Peripheral Pulses: +2 palpable, equal bilaterally       Labs:   Recent Labs     08/01/19  1646 08/02/19  0713   WBC 16.9* 14.1*   HGB 15.4 13.3*   HCT 44.6 39.0*    207     Recent Labs     08/01/19  1646 08/02/19  0713   * 138   K 4.4 3.3*   CL 96* 105   CO2 25 25   BUN 19 17   CREATININE 0.7* <0.5*   CALCIUM 9.8 9.4     Recent Labs     08/01/19  1646   AST 26   ALT 16   BILITOT 1.4*   ALKPHOS 89     No results for input(s): INR in the last 72 hours. Recent Labs     08/01/19  1646   TROPONINI <0.01       Urinalysis:      Lab Results   Component Value Date    NITRU Negative 08/01/2019    WBCUA None seen 08/01/2019    RBCUA None seen 08/01/2019    BLOODU Negative 08/01/2019    SPECGRAV 1.010 08/01/2019    GLUCOSEU 100 08/01/2019       Radiology:  CT Chest WO Contrast   Final Result   Multifocal airspace disease bilaterally. Pneumonia is favored. Ascending aorta measuring 4.6 cm in greatest dimension consistent with mild   aneurysmal dilatation. Follow-up recommended         XR CHEST PORTABLE   Final Result   No acute process. Assessment/Plan:    Active Hospital Problems    Diagnosis    CAD (coronary artery disease) [I25.10]    COPD (chronic obstructive pulmonary disease) ([facility] [J44.9]    Hyperlipidemia [E78.5]    Hypertension [I10]    Pneumonia [J18.9]    Acute respiratory failure with hypoxia ([facility] [J96.01]    Sepsis ([facility] [A41.9]     Suspect recent viral URI than sec bacterial PNA  No signs of aspiration per pt    Check sputum cx  Cont empiric Rocephin, Azithro.   DC MIVF  Cont prn Albuterol neb, add acapella    If remains on 02 into tomorrow, will consider short course Prednisone    DVT Prophylaxis: Lovenox  Diet: DIET GENERAL;  Code Status: Full Code    PT/OT Eval Status: None    Dispo - Home over weekend    Francois Diallo MD

## 2019-08-04 VITALS
HEIGHT: 71 IN | OXYGEN SATURATION: 93 % | TEMPERATURE: 98 F | SYSTOLIC BLOOD PRESSURE: 137 MMHG | HEART RATE: 69 BPM | DIASTOLIC BLOOD PRESSURE: 69 MMHG | RESPIRATION RATE: 18 BRPM | WEIGHT: 180.6 LBS | BODY MASS INDEX: 25.28 KG/M2

## 2019-08-04 PROBLEM — J18.9 PNA (PNEUMONIA): Status: ACTIVE | Noted: 2019-08-04

## 2019-08-04 LAB
ANION GAP SERPL CALCULATED.3IONS-SCNC: 13 MMOL/L (ref 3–16)
BUN BLDV-MCNC: 14 MG/DL (ref 7–20)
CALCIUM SERPL-MCNC: 10.2 MG/DL (ref 8.3–10.6)
CHLORIDE BLD-SCNC: 102 MMOL/L (ref 99–110)
CO2: 22 MMOL/L (ref 21–32)
CREAT SERPL-MCNC: <0.5 MG/DL (ref 0.8–1.3)
CULTURE, RESPIRATORY: NORMAL
GFR AFRICAN AMERICAN: >60
GFR NON-AFRICAN AMERICAN: >60
GLUCOSE BLD-MCNC: 110 MG/DL (ref 70–99)
GRAM STAIN RESULT: NORMAL
HCT VFR BLD CALC: 40.5 % (ref 40.5–52.5)
HEMOGLOBIN: 13.9 G/DL (ref 13.5–17.5)
MAGNESIUM: 2 MG/DL (ref 1.8–2.4)
MCH RBC QN AUTO: 30.8 PG (ref 26–34)
MCHC RBC AUTO-ENTMCNC: 34.4 G/DL (ref 31–36)
MCV RBC AUTO: 89.6 FL (ref 80–100)
PDW BLD-RTO: 14.9 % (ref 12.4–15.4)
PLATELET # BLD: 316 K/UL (ref 135–450)
PMV BLD AUTO: 7.4 FL (ref 5–10.5)
POTASSIUM SERPL-SCNC: 3.2 MMOL/L (ref 3.5–5.1)
RBC # BLD: 4.52 M/UL (ref 4.2–5.9)
SODIUM BLD-SCNC: 137 MMOL/L (ref 136–145)
WBC # BLD: 7.2 K/UL (ref 4–11)

## 2019-08-04 PROCEDURE — 2580000003 HC RX 258: Performed by: NURSE PRACTITIONER

## 2019-08-04 PROCEDURE — 85027 COMPLETE CBC AUTOMATED: CPT

## 2019-08-04 PROCEDURE — 94640 AIRWAY INHALATION TREATMENT: CPT

## 2019-08-04 PROCEDURE — 6360000002 HC RX W HCPCS: Performed by: HOSPITALIST

## 2019-08-04 PROCEDURE — 94669 MECHANICAL CHEST WALL OSCILL: CPT

## 2019-08-04 PROCEDURE — 36415 COLL VENOUS BLD VENIPUNCTURE: CPT

## 2019-08-04 PROCEDURE — 6360000002 HC RX W HCPCS: Performed by: NURSE PRACTITIONER

## 2019-08-04 PROCEDURE — 83735 ASSAY OF MAGNESIUM: CPT

## 2019-08-04 PROCEDURE — 6370000000 HC RX 637 (ALT 250 FOR IP): Performed by: HOSPITALIST

## 2019-08-04 PROCEDURE — 80048 BASIC METABOLIC PNL TOTAL CA: CPT

## 2019-08-04 PROCEDURE — 6370000000 HC RX 637 (ALT 250 FOR IP): Performed by: NURSE PRACTITIONER

## 2019-08-04 RX ORDER — POTASSIUM CHLORIDE 20 MEQ/1
40 TABLET, EXTENDED RELEASE ORAL ONCE
Status: COMPLETED | OUTPATIENT
Start: 2019-08-04 | End: 2019-08-04

## 2019-08-04 RX ORDER — LEVOFLOXACIN 750 MG/1
750 TABLET ORAL DAILY
Qty: 3 TABLET | Refills: 0 | Status: SHIPPED | OUTPATIENT
Start: 2019-08-04 | End: 2019-08-07

## 2019-08-04 RX ORDER — PREDNISONE 20 MG/1
40 TABLET ORAL DAILY
Qty: 10 TABLET | Refills: 0 | Status: SHIPPED | OUTPATIENT
Start: 2019-08-05 | End: 2019-08-10

## 2019-08-04 RX ADMIN — HYDROCHLOROTHIAZIDE 25 MG: 12.5 CAPSULE ORAL at 09:09

## 2019-08-04 RX ADMIN — Medication 2 PUFF: at 08:50

## 2019-08-04 RX ADMIN — Medication 10 ML: at 10:52

## 2019-08-04 RX ADMIN — PREDNISONE 40 MG: 20 TABLET ORAL at 09:09

## 2019-08-04 RX ADMIN — CLONIDINE HYDROCHLORIDE 0.3 MG: 0.1 TABLET ORAL at 09:09

## 2019-08-04 RX ADMIN — POTASSIUM CHLORIDE 40 MEQ: 20 TABLET, EXTENDED RELEASE ORAL at 10:51

## 2019-08-04 RX ADMIN — ALBUTEROL SULFATE 2.5 MG: 2.5 SOLUTION RESPIRATORY (INHALATION) at 08:50

## 2019-08-04 RX ADMIN — ASPIRIN 81 MG: 81 TABLET, COATED ORAL at 09:09

## 2019-08-04 RX ADMIN — AZITHROMYCIN MONOHYDRATE 500 MG: 500 INJECTION, POWDER, LYOPHILIZED, FOR SOLUTION INTRAVENOUS at 10:48

## 2019-08-04 RX ADMIN — POTASSIUM CHLORIDE 20 MEQ: 20 TABLET, EXTENDED RELEASE ORAL at 09:09

## 2019-08-04 RX ADMIN — CLOPIDOGREL BISULFATE 75 MG: 75 TABLET ORAL at 09:09

## 2019-08-04 RX ADMIN — CEFTRIAXONE SODIUM 1 G: 1 INJECTION, POWDER, FOR SOLUTION INTRAMUSCULAR; INTRAVENOUS at 10:15

## 2019-08-04 RX ADMIN — LOSARTAN POTASSIUM 100 MG: 25 TABLET, FILM COATED ORAL at 09:09

## 2019-08-04 RX ADMIN — LEVOTHYROXINE SODIUM 150 MCG: 150 TABLET ORAL at 05:22

## 2019-08-04 RX ADMIN — ENOXAPARIN SODIUM 40 MG: 40 INJECTION SUBCUTANEOUS at 09:09

## 2019-08-04 ASSESSMENT — PAIN SCALES - GENERAL
PAINLEVEL_OUTOF10: 0

## 2019-08-06 LAB
BLOOD CULTURE, ROUTINE: NORMAL
CULTURE, BLOOD 2: NORMAL

## 2020-03-26 ENCOUNTER — APPOINTMENT (OUTPATIENT)
Dept: GENERAL RADIOLOGY | Age: 80
End: 2020-03-26
Payer: MEDICARE

## 2020-03-26 ENCOUNTER — HOSPITAL ENCOUNTER (EMERGENCY)
Age: 80
Discharge: HOME | End: 2020-03-26
Attending: EMERGENCY MEDICINE
Payer: MEDICARE

## 2020-03-26 VITALS
SYSTOLIC BLOOD PRESSURE: 154 MMHG | OXYGEN SATURATION: 97 % | HEART RATE: 59 BPM | WEIGHT: 188 LBS | BODY MASS INDEX: 26.32 KG/M2 | DIASTOLIC BLOOD PRESSURE: 84 MMHG | TEMPERATURE: 98.2 F | HEIGHT: 71 IN | RESPIRATION RATE: 18 BRPM

## 2020-03-26 LAB
A/G RATIO: 1.3 (ref 1.1–2.2)
ALBUMIN SERPL-MCNC: 4.1 G/DL (ref 3.4–5)
ALP BLD-CCNC: 108 U/L (ref 40–129)
ALT SERPL-CCNC: 10 U/L (ref 10–40)
ANION GAP SERPL CALCULATED.3IONS-SCNC: 11 MMOL/L (ref 3–16)
AST SERPL-CCNC: 15 U/L (ref 15–37)
BASOPHILS ABSOLUTE: 0 K/UL (ref 0–0.2)
BASOPHILS RELATIVE PERCENT: 0.8 %
BILIRUB SERPL-MCNC: 0.4 MG/DL (ref 0–1)
BUN BLDV-MCNC: 11 MG/DL (ref 7–20)
CALCIUM SERPL-MCNC: 10 MG/DL (ref 8.3–10.6)
CHLORIDE BLD-SCNC: 101 MMOL/L (ref 99–110)
CO2: 26 MMOL/L (ref 21–32)
CREAT SERPL-MCNC: 0.6 MG/DL (ref 0.8–1.3)
EKG ATRIAL RATE: 64 BPM
EKG DIAGNOSIS: NORMAL
EKG P AXIS: 52 DEGREES
EKG P-R INTERVAL: 196 MS
EKG Q-T INTERVAL: 400 MS
EKG QRS DURATION: 104 MS
EKG QTC CALCULATION (BAZETT): 412 MS
EKG R AXIS: -42 DEGREES
EKG T AXIS: 68 DEGREES
EKG VENTRICULAR RATE: 64 BPM
EOSINOPHILS ABSOLUTE: 0.2 K/UL (ref 0–0.6)
EOSINOPHILS RELATIVE PERCENT: 3.5 %
GFR AFRICAN AMERICAN: >60
GFR NON-AFRICAN AMERICAN: >60
GLOBULIN: 3.1 G/DL
GLUCOSE BLD-MCNC: 145 MG/DL (ref 70–99)
HCT VFR BLD CALC: 45.9 % (ref 40.5–52.5)
HEMOGLOBIN: 15.1 G/DL (ref 13.5–17.5)
LYMPHOCYTES ABSOLUTE: 1 K/UL (ref 1–5.1)
LYMPHOCYTES RELATIVE PERCENT: 15.3 %
MCH RBC QN AUTO: 28.5 PG (ref 26–34)
MCHC RBC AUTO-ENTMCNC: 32.9 G/DL (ref 31–36)
MCV RBC AUTO: 86.8 FL (ref 80–100)
MONOCYTES ABSOLUTE: 0.5 K/UL (ref 0–1.3)
MONOCYTES RELATIVE PERCENT: 8.5 %
NEUTROPHILS ABSOLUTE: 4.5 K/UL (ref 1.7–7.7)
NEUTROPHILS RELATIVE PERCENT: 71.9 %
PDW BLD-RTO: 15 % (ref 12.4–15.4)
PLATELET # BLD: 279 K/UL (ref 135–450)
PMV BLD AUTO: 7.1 FL (ref 5–10.5)
POTASSIUM REFLEX MAGNESIUM: 3.9 MMOL/L (ref 3.5–5.1)
RBC # BLD: 5.3 M/UL (ref 4.2–5.9)
SODIUM BLD-SCNC: 138 MMOL/L (ref 136–145)
TOTAL PROTEIN: 7.2 G/DL (ref 6.4–8.2)
TROPONIN: <0.01 NG/ML
TSH REFLEX: 0.43 UIU/ML (ref 0.27–4.2)
WBC # BLD: 6.3 K/UL (ref 4–11)

## 2020-03-26 PROCEDURE — 84484 ASSAY OF TROPONIN QUANT: CPT

## 2020-03-26 PROCEDURE — 93010 ELECTROCARDIOGRAM REPORT: CPT | Performed by: INTERNAL MEDICINE

## 2020-03-26 PROCEDURE — 84443 ASSAY THYROID STIM HORMONE: CPT

## 2020-03-26 PROCEDURE — 85025 COMPLETE CBC W/AUTO DIFF WBC: CPT

## 2020-03-26 PROCEDURE — 99285 EMERGENCY DEPT VISIT HI MDM: CPT

## 2020-03-26 PROCEDURE — 80053 COMPREHEN METABOLIC PANEL: CPT

## 2020-03-26 PROCEDURE — 93005 ELECTROCARDIOGRAM TRACING: CPT | Performed by: PHYSICIAN ASSISTANT

## 2020-03-26 PROCEDURE — 71046 X-RAY EXAM CHEST 2 VIEWS: CPT

## 2020-03-26 NOTE — ED PROVIDER NOTES
ED Attending Note    Vega Malave was initially seen and evaluated by the advanced practice provider. Briefly, this is a [de-identified] y.o. male who presented with concern for bradycardia and generalized weakness. Weakness started three days ago. Feeling better now. Checked his BP at home and noted his HR was in the 50s. Called his PCP who advised presentation to Emergency Dept. Focused exam:   General: appears stated age, nontoxic, NAD  HEENT: NCAT. PERRLA. CV: RRR, no M/R/G  Pulm: CTAB. No increased WOB. Abd: Soft, nontender, nondistended. No rebound or guarding. EKG interpreted by myself. Rate: normal  Rhythm: NSR  Axis: -42  Intervals: within normal limits  ST Segments: no acute abnormality  T waves: no acute abnormality  Comparison: no prior  Impression: NSR with left axis deviation and incomplete right bundle branch block     The patient is very well in appearance with no complaint at this time. Vital signs within normal limits. EKG with no acute ischemic abnormalities. For all further details of the patient's emergency department visit, please see the advanced practice provider's documentation. Angelo Coelho MD     This report has been produced using speech recognition software and may contain errors related to that system including errors in grammar, punctuation, and spelling, as well as words and phrases that may be inappropriate. If there are any questions or concerns please feel free to contact the dictating provider for clarification.        Angelo Coelho MD  03/26/20 4830

## 2020-03-26 NOTE — ED PROVIDER NOTES
[address] ED  EMERGENCY DEPARTMENT ENCOUNTER        Pt Name: Dennis Hines  MRN: [id number]  [place] 1940  Date of evaluation: 3/26/2020  Provider: TOBIAS Norman  PCP: Stella Grover MD    This patient was seen and evaluated by the attending Mono Hou       Chief Complaint   Patient presents with    Bradycardia     patient states his heart rate has been in the low to mid 50's. Patient also complains of weakness and facial swelling       HISTORY OF PRESENT ILLNESS   (Location/Symptom, Timing/Onset, Context/Setting, Quality, Duration, Modifying Factors, Severity)  Note limiting factors. Dennis Hines is a [de-identified] y.o. male who presents via private vehicle for evaluation of low heart rate. Patient has significant past medical history of thoracic aortic aneurysm. He recently had a echocardiogram on the 23rd of this month, it revealed stable appearing disease, he had estimated 50% ejection fraction of the left ventricle, mild aortic valve regurgitation, moderate mitral valve calcification, mild left atrium daily Tatian, mild right ventricle dilatation, normal atrial septum, normal IVC size. Patient was told by his family doctor that he needs to be monitoring his blood pressures secondary to his stable thoracic aortic aneurysm. Patient notes that he took his blood pressure today and yesterday and his pulse was also obtained, he notes that his pulse was in the low 50s. He notes that for the last 6+ months he has had generalized weakness that is worse when he wakes up in the morning and improves throughout the day. Additionally he notes intermittent pins and needle sensation to his fingers and to his face. These seem to come and go are random. He has been seen and evaluated for this by his PCP.   He presents today because of the low heart rate and he called his PCPs office and was told to come to the ER because with his symptoms of generalized weakness that could be because his heart rate is low. Patient denies any new medications, he endorses compliance with all of his medications. He denies any nausea vomiting abdominal pain chest pain cough shortness of breath difficulty breathing back pain. He denies any new or worsening weakness or paresthesias. He denies any headaches or vision changes. No recent travel. They deny history of recent contact with anyone who has recently traveled or been in contact with anyone who has fallen seriously ill or tested positive for COVID 19. Nursing Notes were all reviewed and agreed with or any disagreements were addressed  in the HPI. REVIEW OF SYSTEMS    (2-9 systems for level 4, 10 or more for level 5)     Review of Systems    Positives and Pertinent negatives as per HPI. Except as noted abovein the ROS, all other systems were reviewed and negative. PAST MEDICAL HISTORY     Past Medical History:   Diagnosis Date    Asthma     CAD (coronary artery disease)     Colon polyp     COPD (chronic obstructive pulmonary disease) ([facility] [address]     Hyperlipidemia     Hypertension     Thyroid disease          SURGICAL HISTORY     Past Surgical History:   Procedure Laterality Date    COLONOSCOPY  7/29/14    normal    CORONARY ANGIOPLASTY WITH STENT PLACEMENT  2001    EYE SURGERY      HERNIA REPAIR           CURRENTMEDICATIONS       Discharge Medication List as of 3/26/2020 12:39 PM      CONTINUE these medications which have NOT CHANGED    Details   atorvastatin (LIPITOR) 20 MG tablet Historical Med      budesonide-formoterol (SYMBICORT) 160-4.5 MCG/ACT AERO Inhale 2 puffs into the lungs 2 times dailyHistorical Med      clonidine (CATAPRES) 0.1 MG tablet Take 0.3 mg by mouth 2 times daily. losartan-hydrochlorothiazide (HYZAAR) 50-12.5 MG per tablet Take 1 tablet by mouth 2 times daily. potassium chloride (KLOR-CON) 10 MEQ CR tablet Take 20 mEq by mouth daily.         levothyroxine (SYNTHROID) 175 MCG tablet Take 150 mcg by mouth daily Historical Med      clopidogrel (PLAVIX) 75 MG tablet Take 75 mg by mouth daily. diltiazem (CARDIZEM CD) 240 MG ER capsule Take 240 mg by mouth daily. aspirin 81 MG EC tablet Take 81 mg by mouth daily. albuterol (PROVENTIL) (2.5 MG/3ML) 0.083% nebulizer solution Take 2.5 mg by nebulization every 6 hours as needed. fexofenadine (ALLEGRA ALLERGY) 60 MG tablet Take 10 mg by mouth nightlyHistorical Med      simvastatin (ZOCOR) 40 MG tablet Take 40 mg by mouth nightly. ALLERGIES     Iv dye [iodides]    FAMILYHISTORY       Family History   Problem Relation Age of Onset    Cancer Maternal Uncle         colon ca          SOCIAL HISTORY       Social History     Socioeconomic History    Marital status:       Spouse name: None    Number of children: None    Years of education: None    Highest education level: None   Occupational History    None   Social Needs    Financial resource strain: None    Food insecurity     Worry: None     Inability: None    Transportation needs     Medical: None     Non-medical: None   Tobacco Use    Smoking status: Former Smoker   Substance and Sexual Activity    Alcohol use: No    Drug use: None    Sexual activity: None   Lifestyle    Physical activity     Days per week: None     Minutes per session: None    Stress: None   Relationships    Social connections     Talks on phone: None     Gets together: None     Attends Rastafari service: None     Active member of club or organization: None     Attends meetings of clubs or organizations: None     Relationship status: None    Intimate partner violence     Fear of current or ex partner: None     Emotionally abused: None     Physically abused: None     Forced sexual activity: None   Other Topics Concern    None   Social History Narrative    None       SCREENINGS    Badger Coma Scale  Eye Opening: Spontaneous  Best Verbal Response: VW)   Preliminary Result   No acute cardiopulmonary disease. Xr Chest Standard (2 Vw)    Result Date: 3/26/2020  EXAMINATION: TWO XRAY VIEWS OF THE CHEST 3/26/2020 11:42 am COMPARISON: 08/01/2019, 01/10/2011 HISTORY: ORDERING SYSTEM PROVIDED HISTORY: bradycardia, weakness TECHNOLOGIST PROVIDED HISTORY: Reason for exam:->bradycardia, weakness Reason for Exam: bradycardia, weakness FINDINGS: Frontal and lateral views of the chest were performed. There is no acute skeletal abnormality. The heart size and mediastinal contours are stable, with stable tortuosity and atherosclerotic disease of the intrathoracic aorta. There is mild bibasilar parenchymal scar. There is mild biapical pleural and parenchymal scar also noted. The lungs are otherwise clear, without evidence of acute airspace consolidation, pneumothorax, or pleural effusion. No acute cardiopulmonary disease. PROCEDURES   Unless otherwise noted below, none     Procedures    CRITICAL CARE TIME   N/A    CONSULTS:  None      EMERGENCY DEPARTMENT COURSE and DIFFERENTIALDIAGNOSIS/MDM:   Vitals:    Vitals:    03/26/20 1120 03/26/20 1221 03/26/20 1255   BP: (!) 141/86 116/77 (!) 154/84   Pulse: 67 67 59   Resp: 18 14 18   Temp: 98.4 °F (36.9 °C)  98.2 °F (36.8 °C)   TempSrc: Oral  Oral   SpO2: 98% 97%    Weight: 188 lb (85.3 kg)     Height: 5' 11\" (1.803 m)         Patient was given thefollowing medications:  Medications - No data to display    Patient is an 34-year-old male who presents for evaluation of bradycardia. On exam he is alert oriented afebrile well-perfused. His blood pressure is mildly elevated however he is normal rate normal rhythm on my physical exam.  I do not appreciate any loud murmurs or bruits. He appears well overall, nontoxic. Heart and lung sounds are normal, his abdomen soft nontender I do not appreciate an abdominal bruit.   No gross neuro deficits appreciated on physical exam.  Patient did not become acutely symptomatic while in the emergency department. Chest x-ray obtained, no evidence of acute cardiopulmonary abnormality. No widened mediastinum. He is not having any chest pain however troponin was obtained, is negative. He has no lower extremity edema is not hypoxic I have low concern for acute CHF exacerbation. He has a no focal neuro deficits, symptoms of been going on for 6 months I have low concern for acute stroke. Twelve-lead EKG is negative for sign of acute ischemia today. He has normal sinus rhythm, noted possible left atrial enlargement left axis deviation and incomplete right bundle branch block was noted. I do not believe that these are currently causing patient's symptoms. TSH is within normal limits. Patient did not become overtly bradycardic in the emergency department however his last vital signs were noted that his pulse was 59 bpm.  At this time I do believe patient is reasonable candidate for discharge home with close follow-up with his PCP. Additionally I will give him referral to cardiology, I discussed that he may need to wear a Holter monitor. No medication changes at this time. Patient advised to return if he has new or worsening symptoms. Based on patient's clinical history and clinical findings I currently estimate there is low risk for:  ACUTE CORONARY SYNDROME, INTRACRANIAL HEMORRHAGE, MALIGNANT DYSRHYTHMIA, MENINGITIS, PNEUMONIA, PULMONARY EMBOLISM, SEPSIS, SUBARACHNOID HEMORRHAGE, SUBDURAL HEMATOMA, STROKE, or URINARY TRACT INFECTION, thus I consider the discharge disposition reasonable. Reza Burns and I have discussed the diagnosis and risks, and we agree with discharging home to follow-up with their primary doctor. We also discussed returning to the Emergency Department immediately if new or worsening symptoms occur. We have discussed the symptoms which are most concerning (e.g., changing or worsening pain, weakness, vomiting, fever) that necessitate immediate return. Pt is in agreement with the current plan and all questions were addressed. I discussed my PE findings, diagnostic results, assessment and plan for home discharge with my supervisor who agrees with the above stated plan. FINAL IMPRESSION      1. Bradycardia    2.  Generalized weakness          DISPOSITION/PLAN   DISPOSITION Decision To Discharge 03/26/2020 12:55:05 PM      PATIENT REFERREDTO:  Ar[address]  [phone]    Schedule an appointment as soon as possible for a visit   for evaluation    [facility]  [address] [facility]  Go to   If symptoms worsen    [address], [facility]. Brian Larios [facility]  [phone]  Schedule an appointment as soon as possible for a visit   for evaluation      DISCHARGE MEDICATIONS:  Discharge Medication List as of 3/26/2020 12:39 PM          DISCONTINUED MEDICATIONS:  Discharge Medication List as of 3/26/2020 12:39 PM                 (Please note that portions ofthis note were completed with a voice recognition program.  Efforts were made to edit the dictations but occasionally words are mis-transcribed.)    Kendra Louis (electronically signed)        TOBIAS Louis  03/26/20 9965

## 2020-03-27 ENCOUNTER — CARE COORDINATION (OUTPATIENT)
Dept: CARE COORDINATION | Age: 80
End: 2020-03-27

## 2020-03-27 NOTE — CARE COORDINATION
Patient contacted regarding recent discharge and COVID-19 risk   Care Transition Nurse/ Ambulatory Care Manager contacted the patient by telephone to perform post discharge assessment. Verified name and  with patient as identifiers. Patient has following risk factors of: cardiovascular disease  and COPD. CTN/ACM reviewed discharge instructions, medical action plan and red flags related to discharge diagnosis. Reviewed and educated them on any new and changed medications related to discharge diagnosis. Advised obtaining a 90-day supply of all daily and as-needed medications. Education provided regarding infection prevention, and signs and symptoms of COVID-19 and when to seek medical attention with patient who verbalized understanding. Discussed exposure protocols and quarantine from [address] you at higher risk for severe illness  and given an opportunity for questions and concerns. The patient agrees to contact the COVID-19 hotline [phone] or PCP office for questions related to their healthcare. CTN/ACM provided contact information for future reference. From CDC: Are you at higher risk for severe illness?  Wash your hands often.  Avoid close contact (6 feet, which is about two arm lengths) with people who are sick.  Put distance between yourself and other people if COVID-19 is spreading in your community.  Clean and disinfect frequently touched surfaces.  Avoid all cruise travel and non-essential air travel.  Call your healthcare professional if you have concerns about COVID-19 and your underlying condition or if you are sick. For more information on steps you can take to protect yourself, see CDC's How to Protect Yourself    Reviewed discharge instruction to call pcp and cardiologist for appointments.

## 2020-04-09 ENCOUNTER — CARE COORDINATION (OUTPATIENT)
Dept: CARE COORDINATION | Age: 80
End: 2020-04-09

## 2020-04-10 ENCOUNTER — CARE COORDINATION (OUTPATIENT)
Dept: CARE COORDINATION | Age: 80
End: 2020-04-10

## 2020-04-10 NOTE — CARE COORDINATION
COVID-19 Screening Follow-up Note    Patient contacted regarding COVID-19 risk and screening. Care Transition Nurse/ Ambulatory Care Manager contacted the patient by telephone to perform follow-up assessment. Verified name and  with patient as identifiers. Patient has following risk factors of: copd, cad, immunocompromised    Symptoms reviewed with patient who verbalized the following symptoms: no new/worsening symptoms       Due to no new or worsening symptoms encounter was not routed to provider for escalation. Education provided regarding infection prevention, and signs and symptoms of COVID-19 and when to seek medical attention with patient who verbalized understanding. Discussed exposure protocols and quarantine from [address] you at higher risk for severe illness  and given an opportunity for questions and concerns. The patient agrees to contact the COVID-19 hotline [phone] or PCP office for questions related to their healthcare. CTN/ACM provided contact information for future reference. From CDC: Are you at higher risk for severe illness?  Wash your hands often.  Avoid close contact (6 feet, which is about two arm lengths) with people who are sick.  Put distance between yourself and other people if COVID-19 is spreading in your community.  Clean and disinfect frequently touched surfaces.  Avoid all cruise travel and non-essential air travel.  Call your healthcare professional if you have concerns about COVID-19 and your underlying condition or if you are sick.     For more information on steps you can take to protect yourself, see CDC's How to Protect Yourself

## 2020-11-03 PROBLEM — J18.9 PNEUMONIA: Status: RESOLVED | Noted: 2019-08-01 | Resolved: 2020-11-03

## 2024-02-28 ENCOUNTER — OFFICE VISIT (OUTPATIENT)
Dept: ORTHOPEDIC SURGERY | Age: 84
End: 2024-02-28
Payer: MEDICARE

## 2024-02-28 VITALS — WEIGHT: 188 LBS | BODY MASS INDEX: 26.32 KG/M2 | HEIGHT: 71 IN

## 2024-02-28 DIAGNOSIS — M79.645 THUMB PAIN, LEFT: Primary | ICD-10-CM

## 2024-02-28 PROCEDURE — G8419 CALC BMI OUT NRM PARAM NOF/U: HCPCS | Performed by: STUDENT IN AN ORGANIZED HEALTH CARE EDUCATION/TRAINING PROGRAM

## 2024-02-28 PROCEDURE — G8427 DOCREV CUR MEDS BY ELIG CLIN: HCPCS | Performed by: STUDENT IN AN ORGANIZED HEALTH CARE EDUCATION/TRAINING PROGRAM

## 2024-02-28 PROCEDURE — G8484 FLU IMMUNIZE NO ADMIN: HCPCS | Performed by: STUDENT IN AN ORGANIZED HEALTH CARE EDUCATION/TRAINING PROGRAM

## 2024-02-28 PROCEDURE — 1123F ACP DISCUSS/DSCN MKR DOCD: CPT | Performed by: STUDENT IN AN ORGANIZED HEALTH CARE EDUCATION/TRAINING PROGRAM

## 2024-02-28 PROCEDURE — 4004F PT TOBACCO SCREEN RCVD TLK: CPT | Performed by: STUDENT IN AN ORGANIZED HEALTH CARE EDUCATION/TRAINING PROGRAM

## 2024-02-28 PROCEDURE — 99204 OFFICE O/P NEW MOD 45 MIN: CPT | Performed by: STUDENT IN AN ORGANIZED HEALTH CARE EDUCATION/TRAINING PROGRAM

## 2024-03-01 NOTE — PROGRESS NOTES
Procedure Laterality Date    COLONOSCOPY  7/29/14    normal    CORONARY ANGIOPLASTY WITH STENT PLACEMENT  2001    EYE SURGERY      HERNIA REPAIR         Social History     Social History     Occupational History    Not on file   Tobacco Use    Smoking status: Former    Smokeless tobacco: Not on file   Substance and Sexual Activity    Alcohol use: No    Drug use: Not on file    Sexual activity: Not on file         Physical Exam     Vital Signs:  Ht 1.803 m (5' 11\")   Wt 85.3 kg (188 lb)   BMI 26.22 kg/m²   BMI: Body mass index is 26.22 kg/m².    General appearance: healthy, alert, no distress, appropriate for stated age  HEENT: normocephalic, atraumatic  Respiratory exam: Breathing easy and unlabored on room air  Cardiovascular: Extremities warm and well perfused with brisk capillary refill, no significant edema.  Lymphatic: No obvious lymphadenopathy  Neuro: Alert and oriented to person, place, time, and situation. No focal, motor, or sensory deficit except as noted below.  Psychiatric: Mood and affect normal and appropriate       Left hand Exam     No deformity or abnormality on inspection, skin has normal appearance and texture consistent with age. No significant lesions or abnormalities     Maximal tenderness over the IP joint of the thumb, mild tenderness over the A1 pulley of the thumb, minimal tenderness over the thumb CMC joint    Nodule palpable over the A1 pulley, no reproducible triggering     Full sensation in a median, ulnar and radial nerve distribution     Patient has full digital flexion and extension, wrist flexion extension and prosupination are within normal limits     All fingertips are pink with good capillary refill and of normal temperature.  No edema.  No areas of ischemia       Radiographs & Imaging     5 view X-rays of the left thumb dated 2/28/24 were reviewed independently and discussed with the patient.  The films revealed: joint space narrowing and osteophyte deposition of the thumb